# Patient Record
Sex: MALE | URBAN - METROPOLITAN AREA
[De-identification: names, ages, dates, MRNs, and addresses within clinical notes are randomized per-mention and may not be internally consistent; named-entity substitution may affect disease eponyms.]

---

## 2021-11-16 ENCOUNTER — OFFICE VISIT (OUTPATIENT)
Dept: URBAN - METROPOLITAN AREA CLINIC 10 | Facility: CLINIC | Age: 66
End: 2021-11-16
Payer: COMMERCIAL

## 2021-11-16 DIAGNOSIS — Z79.84 LONG TERM (CURRENT) USE OF ORAL HYPOGLYCEMIC DRUGS: ICD-10-CM

## 2021-11-16 DIAGNOSIS — E11.3413 TYPE 2 DIAB W SEVERE NONPRLF DIABETIC RTNOP W MACULAR EDEMA, BILATERAL: Primary | ICD-10-CM

## 2021-11-16 DIAGNOSIS — H40.013 OPEN ANGLE WITH BORDERLINE FINDINGS, LOW RISK, BILATERAL: ICD-10-CM

## 2021-11-16 DIAGNOSIS — H25.813 COMBINED FORMS OF AGE-RELATED CATARACT, BILATERAL: ICD-10-CM

## 2021-11-16 PROCEDURE — 92250 FUNDUS PHOTOGRAPHY W/I&R: CPT | Performed by: OPTOMETRIST

## 2021-11-16 PROCEDURE — 92134 CPTRZ OPH DX IMG PST SGM RTA: CPT | Performed by: OPTOMETRIST

## 2021-11-16 PROCEDURE — 99204 OFFICE O/P NEW MOD 45 MIN: CPT | Performed by: OPTOMETRIST

## 2021-11-16 ASSESSMENT — INTRAOCULAR PRESSURE
OS: 11
OD: 10

## 2021-11-16 ASSESSMENT — VISUAL ACUITY
OD: 20/25
OS: 20/25

## 2021-11-16 ASSESSMENT — KERATOMETRY
OS: 42.38
OD: 42.25

## 2021-11-16 NOTE — IMPRESSION/PLAN
Impression: Open angle with borderline findings, low risk, bilateral: H40.013. Plan: IOP is low OU. Denies fhx. Nerves are suspicious. RTC for possible baseline HVF 24-2, NFL OCT, pachs and IOP check in 1 month.

## 2021-11-16 NOTE — IMPRESSION/PLAN
Impression: Type 2 diab w severe nonprlf diabetic rtnop w macular edema, bilateral: X90.2613. Plan: Diabetes type II: severe background diabetic retinopathy, no gross signs of neovascularization noted. Will consult retina for evaluation. HE in Macula c no DME on Mac OCT.